# Patient Record
(demographics unavailable — no encounter records)

---

## 2024-10-20 NOTE — HISTORY OF PRESENT ILLNESS
[Pain is well-controlled] : pain is well-controlled [Fever] : no fever [Chills] : no chills [Nausea] : no nausea [Vomiting] : no vomiting [Clean/Dry/Intact] : clean, dry and intact [Erythema] : not erythematous [None] : no vaginal bleeding [Normal] : normal [Pathology reviewed] : pathology reviewed [de-identified] : p comes for removal of lagos . she feels good , discussed the danger signs if she can't void .  [de-identified] : lagos removed .

## 2024-10-20 NOTE — PLAN
[No Wye] : to avoid sexual intercourse [No Tampons/Suppositories] : against using tampons or vaginal suppositories [No ADLs] : to avoid most activities of daily living [No Work] : not to work [No Housework] : not to do housework [No Sports] : not to participate in sports [FreeTextEntry1] : rto 2 weeks , activity discussed .

## 2024-11-06 NOTE — HISTORY OF PRESENT ILLNESS
[Pain is well-controlled] : pain is well-controlled [Fever] : no fever [Chills] : no chills [Nausea] : no nausea [Vomiting] : no vomiting [Diarrhea] : no diarrhea [Vaginal Bleeding] : no vaginal bleeding [Pelvic Pressure] : no pelvic pressure [Dysuria] : no dysuria [Vaginal Discharge] : no vaginal discharge [Constipation] : no constipation [Clean/Dry/Intact] : clean, dry and intact [Healed] : healed [None] : no vaginal bleeding [Normal] : normal [de-identified] : Patient presents for final post op visit s/p AP repair.  Reports feeling well.  No issues with void.  No heavy vaginal bleeding or discharge.

## 2024-11-06 NOTE — PLAN
[FreeTextEntry1] : Patient doing well Excellent pain control No urinary issues RTO prn and annually for exams

## 2025-03-03 NOTE — ASSESSMENT
[FreeTextEntry1] : 45 year old female found to have stable Anemia, GERD without esophagitis, with the current prescription regimen as recommended, diet and lifestyle modifications, as counseled. Prior results reviewed, interpreted and discussed with the patient during today's examination, as appropriate. Follow up, treatment plan and tests, as ordered.  Discussed with  over phone conversation, who was also her  during today's examination, at the patient's request.  Total time spent:: 25 minutes Including: Preparation prior to visit - Reviewing prior record, results of tests and Consultation Reports as applicable Conducting an appropriate H & P during today's encounter Appropriate orders for tests, medications and procedures, as applicable Counseling patient Note completion

## 2025-03-03 NOTE — HISTORY OF PRESENT ILLNESS
[de-identified] : 45 year old  female patient with history of stable Anemia, GERD without esophagitis, history as stated, presented for follow up examination. Patient is compliant with all medications. ROS as stated.

## 2025-03-03 NOTE — HEALTH RISK ASSESSMENT
[No] : In the past 12 months have you used drugs other than those required for medical reasons? No [No falls in past year] : Patient reported no falls in the past year [0] : 2) Feeling down, depressed, or hopeless: Not at all (0) [de-identified] : GYN [[id number]] : 0

## 2025-06-26 NOTE — ASSESSMENT
[FreeTextEntry1] : 45 year old female found to have stable Anemia, Elevated Hemoglobin A1c, GERD without esophagitis, Neuralgia, with the current prescription regimen as recommended, diet and lifestyle modifications, as counseled. Prior results reviewed, interpreted and discussed with the patient during today's examination, as appropriate. Follow up, treatment plan and tests, as ordered.  Total time spent:: 25 minutes Including: Preparation prior to visit - Reviewing prior record, results of tests and Consultation Reports as applicable Conducting an appropriate H & P during today's encounter Appropriate orders for tests, medications and procedures, as applicable Counseling patient Note completion

## 2025-06-26 NOTE — HISTORY OF PRESENT ILLNESS
[de-identified] : 45 year old  female patient with history of stable Anemia, Elevated Hemoglobin A1c, GERD without esophagitis, Neuralgia, history as stated, presented for follow up examination. Patient is compliant with all medications. ROS as stated.

## 2025-06-26 NOTE — HISTORY OF PRESENT ILLNESS
[de-identified] : 45 year old  female patient with history of stable Anemia, Elevated Hemoglobin A1c, GERD without esophagitis, Neuralgia, history as stated, presented for follow up examination. Patient is compliant with all medications. ROS as stated.

## 2025-06-26 NOTE — HEALTH RISK ASSESSMENT
[No] : In the past 12 months have you used drugs other than those required for medical reasons? No [No falls in past year] : Patient reported no falls in the past year [0] : 2) Feeling down, depressed, or hopeless: Not at all (0) [de-identified] : GYN [[id number]] : 0

## 2025-06-26 NOTE — HEALTH RISK ASSESSMENT
[No] : In the past 12 months have you used drugs other than those required for medical reasons? No [No falls in past year] : Patient reported no falls in the past year [0] : 2) Feeling down, depressed, or hopeless: Not at all (0) [de-identified] : GYN [[id number]] : 0

## 2025-06-26 NOTE — INTERPRETER SERVICES
[Other: ______] : provided by MARIBEL [Interpreters_IDNumber] : [id number] [Interpreters_FullName] : Patricia Penn [TWNoteComboBox1] : [place]

## 2025-06-29 NOTE — HISTORY OF PRESENT ILLNESS
[FreeTextEntry1] : ESTELLE PINZON ,44 YO  Presents for Annual  Pt complains that urine is dark and has odor for 2 weeks  OBHx: NSVDx4, Premature death at 7 months ( delivery) Menopause   Sexually active not using contraception  Last pap on 2024  Last Mammogram 2024 No Family History with breast cancer.

## 2025-06-29 NOTE — PLAN
[FreeTextEntry1] : Dysuria--> UCx    Annual exam: pap smear with cotesting, gonorrhea/chlamydia/trichomonas Emphasize to patient the importance of breast self awareness and discussed the signs and symptoms concerning for breast cancer Mammogram + breast US referrals provided Follow up in 1 year or PRN